# Patient Record
Sex: FEMALE | Race: WHITE | NOT HISPANIC OR LATINO | Employment: OTHER | URBAN - METROPOLITAN AREA
[De-identification: names, ages, dates, MRNs, and addresses within clinical notes are randomized per-mention and may not be internally consistent; named-entity substitution may affect disease eponyms.]

---

## 2020-01-09 ENCOUNTER — NEW PATIENT COMPREHENSIVE (OUTPATIENT)
Dept: URBAN - METROPOLITAN AREA CLINIC 35 | Facility: CLINIC | Age: 40
End: 2020-01-09

## 2020-01-09 DIAGNOSIS — H40.013: ICD-10-CM

## 2020-01-09 DIAGNOSIS — H52.203: ICD-10-CM

## 2020-01-09 PROCEDURE — 92015 DETERMINE REFRACTIVE STATE: CPT

## 2020-01-09 PROCEDURE — 92004 COMPRE OPH EXAM NEW PT 1/>: CPT

## 2020-01-09 ASSESSMENT — VISUAL ACUITY
OS_CC: J3
OD_SC: J4
OS_SC: J6
OS_SC: 20/60-1
OD_SC: 20/30-1
OD_CC: J3

## 2020-01-09 ASSESSMENT — TONOMETRY
OD_IOP_MMHG: 13
OS_IOP_MMHG: 13

## 2020-01-14 ENCOUNTER — GLASSES RECHECK (OUTPATIENT)
Dept: URBAN - METROPOLITAN AREA CLINIC 35 | Facility: CLINIC | Age: 40
End: 2020-01-14

## 2020-01-14 DIAGNOSIS — H52.203: ICD-10-CM

## 2020-01-14 PROCEDURE — 92015GRNC REFRACTION GLASSES RECHECK - NO CHARGE

## 2020-01-14 ASSESSMENT — VISUAL ACUITY: OD_SC: 20/25

## 2020-02-26 NOTE — PATIENT DISCUSSION
2005/2006 [address] WITH PENTOXIFYLINE WITH IMPROVEMENT AND [facility] IN [facility].

## 2020-02-26 NOTE — PATIENT DISCUSSION
Recommend CATARACT EVALUATION AND SURGERY preferably with one piece acrylic lens PRIOR TO RETINAL SURGERY.

## 2020-11-06 NOTE — PATIENT DISCUSSION
2005/2006 [address] WITH PENTOXIFYLINE WITH IMPROVEMENT AND [facility] IN [facility].

## 2021-01-12 NOTE — PATIENT DISCUSSION
2005/2006 [address] WITH PENTOXIFYLINE WITH IMPROVEMENT AND [facility] IN [facility].

## 2021-01-14 NOTE — PATIENT DISCUSSION
2005/2006 [address] WITH PENTOXIFYLINE WITH IMPROVEMENT AND [facility] IN [facility].

## 2021-01-15 NOTE — PATIENT DISCUSSION
2005/2006 [address] WITH PENTOXIFYLINE WITH IMPROVEMENT AND [facility] IN [facility].

## 2021-01-20 NOTE — PATIENT DISCUSSION
No retinal holes or tears seen on exam. 60-% OF BUBBLE REMAINING. RETINA IS FULLY ATTACHED. CONT TX AS PLANNED.

## 2021-01-20 NOTE — PATIENT DISCUSSION
2005/2006 [address] WITH PENTOXIFYLINE WITH IMPROVEMENT AND [facility] IN [facility].

## 2021-01-28 NOTE — PATIENT DISCUSSION
Addended by: SHAHLA SALOMON JR. on: 8/8/2018 12:05 PM     Modules accepted: [place]     My findings and recommendations are based on patient's symptoms, eye exam, diagnostic testing, and records.

## 2021-01-28 NOTE — PATIENT DISCUSSION
2005/2006 [address] WITH PENTOXIFYLINE WITH IMPROVEMENT AND [facility] IN [facility].

## 2021-02-26 NOTE — PATIENT DISCUSSION
2005/2006 [address] WITH PENTOXIFYLINE WITH IMPROVEMENT AND [facility] IN [facility].

## 2021-04-15 ENCOUNTER — ESTABLISHED COMPREHENSIVE EXAM (OUTPATIENT)
Dept: URBAN - METROPOLITAN AREA CLINIC 35 | Facility: CLINIC | Age: 41
End: 2021-04-15

## 2021-04-15 DIAGNOSIS — J30.1: ICD-10-CM

## 2021-04-15 DIAGNOSIS — H40.013: ICD-10-CM

## 2021-04-15 PROCEDURE — 92133 CPTRZD OPH DX IMG PST SGM ON: CPT

## 2021-04-15 PROCEDURE — 92310-1 LEVEL 1 CONTACT LENS MANAGEMENT

## 2021-04-15 PROCEDURE — 92015 DETERMINE REFRACTIVE STATE: CPT

## 2021-04-15 PROCEDURE — 92014 COMPRE OPH EXAM EST PT 1/>: CPT

## 2021-04-15 ASSESSMENT — VISUAL ACUITY
OS_CC: 20/20-1
OS_CC: J1
OD_CC: 20/20
OD_CC: J1

## 2021-04-15 ASSESSMENT — KERATOMETRY
OD_K1POWER_DIOPTERS: 43.75
OD_AXISANGLE2_DEGREES: 080
OD_AXISANGLE_DEGREES: 170
OS_K2POWER_DIOPTERS: 44.50
OS_AXISANGLE_DEGREES: 170
OS_K1POWER_DIOPTERS: 43.75
OD_K2POWER_DIOPTERS: 44.50
OS_AXISANGLE2_DEGREES: 080

## 2021-04-15 ASSESSMENT — TONOMETRY
OS_IOP_MMHG: 18
OD_IOP_MMHG: 19

## 2021-04-28 ENCOUNTER — ESTABLISHED PATIENT (OUTPATIENT)
Dept: URBAN - METROPOLITAN AREA CLINIC 35 | Facility: CLINIC | Age: 41
End: 2021-04-28

## 2021-04-28 DIAGNOSIS — H52.203: ICD-10-CM

## 2021-04-28 PROCEDURE — 92310F

## 2021-04-28 ASSESSMENT — KERATOMETRY
OD_K1POWER_DIOPTERS: 43.75
OD_K2POWER_DIOPTERS: 44.50
OD_AXISANGLE2_DEGREES: 080
OD_AXISANGLE_DEGREES: 170
OS_AXISANGLE_DEGREES: 170
OS_AXISANGLE2_DEGREES: 080
OS_K2POWER_DIOPTERS: 44.50
OS_K1POWER_DIOPTERS: 43.75

## 2021-05-06 ENCOUNTER — CONTACT LENS FOLLOW UP (OUTPATIENT)
Dept: URBAN - METROPOLITAN AREA CLINIC 35 | Facility: CLINIC | Age: 41
End: 2021-05-06

## 2021-05-06 DIAGNOSIS — H52.203: ICD-10-CM

## 2021-05-06 PROCEDURE — 92310F

## 2021-05-06 ASSESSMENT — KERATOMETRY
OS_K1POWER_DIOPTERS: 43.75
OD_K2POWER_DIOPTERS: 44.50
OS_AXISANGLE2_DEGREES: 080
OS_K2POWER_DIOPTERS: 44.50
OS_AXISANGLE_DEGREES: 170
OD_K1POWER_DIOPTERS: 43.75
OD_AXISANGLE2_DEGREES: 080
OD_AXISANGLE_DEGREES: 170

## 2021-05-06 ASSESSMENT — VISUAL ACUITY
OD_SC: 20/20
OS_CC: 20/25 CLS

## 2021-05-27 NOTE — PATIENT DISCUSSION
2005/2006 [address] WITH PENTOXIFYLINE WITH IMPROVEMENT AND [facility] IN [facility].

## 2023-11-06 ENCOUNTER — CONSULTATION/EVALUATION (OUTPATIENT)
Dept: URBAN - METROPOLITAN AREA CLINIC 39 | Facility: CLINIC | Age: 43
End: 2023-11-06

## 2023-11-06 DIAGNOSIS — J30.1: ICD-10-CM

## 2023-11-06 DIAGNOSIS — H40.023: ICD-10-CM

## 2023-11-06 PROCEDURE — 92250 FUNDUS PHOTOGRAPHY W/I&R: CPT

## 2023-11-06 PROCEDURE — 99214 OFFICE O/P EST MOD 30 MIN: CPT

## 2023-11-06 PROCEDURE — 92020 GONIOSCOPY: CPT

## 2023-11-06 PROCEDURE — 76514 ECHO EXAM OF EYE THICKNESS: CPT

## 2023-11-06 ASSESSMENT — VISUAL ACUITY
OS_CC: 20/20
OD_CC: 20/15

## 2023-11-06 ASSESSMENT — TONOMETRY
OD_IOP_MMHG: 17
OS_IOP_MMHG: 16

## 2023-11-06 ASSESSMENT — PACHYMETRY
OD_CT_UM: 601
OS_CT_UM: 599

## 2023-11-21 ENCOUNTER — DIAGNOSTICS ONLY (OUTPATIENT)
Dept: URBAN - METROPOLITAN AREA CLINIC 35 | Facility: CLINIC | Age: 43
End: 2023-11-21

## 2023-11-21 DIAGNOSIS — H40.023: ICD-10-CM

## 2023-11-21 PROCEDURE — 92083 EXTENDED VISUAL FIELD XM: CPT

## 2023-11-21 PROCEDURE — 99211T TECH SERVICE
